# Patient Record
(demographics unavailable — no encounter records)

---

## 2025-01-16 NOTE — PHYSICAL EXAM
[Well Developed] : well developed [Well Nourished] : well nourished [No Acute Distress] : no acute distress [Normal Conjunctiva] : normal conjunctiva [Normal Venous Pressure] : normal venous pressure [No Carotid Bruit] : no carotid bruit [Normal S1, S2] : normal S1, S2 [No Murmur] : no murmur [No Rub] : no rub [No Gallop] : no gallop [Clear Lung Fields] : clear lung fields [Good Air Entry] : good air entry [No Respiratory Distress] : no respiratory distress  [Soft] : abdomen soft [Non Tender] : non-tender [No Masses/organomegaly] : no masses/organomegaly [Normal Bowel Sounds] : normal bowel sounds [Normal Gait] : normal gait [No Edema] : no edema [No Cyanosis] : no cyanosis [No Clubbing] : no clubbing [No Varicosities] : no varicosities [No Rash] : no rash [No Skin Lesions] : no skin lesions [Moves all extremities] : moves all extremities [No Focal Deficits] : no focal deficits [Normal Speech] : normal speech [Alert and Oriented] : alert and oriented [Appears Anxious] : appears anxious [de-identified] : Disheveled, no distress

## 2025-01-16 NOTE — DISCUSSION/SUMMARY
[Guardian] : the guardian [___ Month(s)] : in [unfilled] month(s) [EKG obtained to assist in diagnosis and management of assessed problem(s)] : EKG obtained to assist in diagnosis and management of assessed problem(s) [FreeTextEntry1] : 49-year-old male with history of right sided CVA in 9/11, followed for h/o of hypertension and hyperlipidemia,  s/p ILR not being followed by EPS, can d/c ILR, task sent again h/o CVA? in 2011 on DAPT unneeded, d/c Plavix, stay on ASA. Once again, advised complete discontinuation of smoking as the most beneficial thing he could do to prevent recurrent stroke. On ALBINO mask at night, when he remembers.   Strongly advised continued dietary modification. Saw Dr. Keith and considering GLP1 agonist for weight loss? Labs requested. Flu shot given. Rx renewed. Referred for chest xray to survey given h/o smoking.

## 2025-01-16 NOTE — REASON FOR VISIT
[Initial Evaluation] : an initial evaluation of [Chest Pain] : chest pain [FreeTextEntry1] : History of CVA

## 2025-01-16 NOTE — CARDIOLOGY SUMMARY
[de-identified] : 1/16/25, 1/15/24, Sinus Rhythm  -Old anterior infarct. 1/13/23, NSR 12/14/21, NSR [de-identified] : 4/21/21, EF 70%, mildly dilated Ao root.

## 2025-01-16 NOTE — HISTORY OF PRESENT ILLNESS
[FreeTextEntry1] : 49 year-old morbidly obese male here for routine physical examination.  He is being treated for HLD, h/o schizoaffective bipolar disorder, on meds.  H/O "mini stroke "in 2011, manifest with blurred vision and slurred speech. All of his symptoms resolved spontaneously. He has been on aspirin and plavix since then. Patient remains a smoker (but claims he is smoking less).   TTE unremarkable, s/p ILR placement in 3/19, followed by EPS at [facility] single event noted in 12/19, likely AV block due to vagal reaction. No recent interrogations from ILR (does not transmit). Referred for explantation.  Followed by Dr. Tomlinson for his COPD.  No new focal complaints at present. Still smoking 4-5 cigs per day, complains of chronic cough.

## 2025-05-19 NOTE — CARDIOLOGY SUMMARY
[de-identified] : 5/19/25, Sinus Rhythm -occasional ectopic ventricular beat   -Nonspecific QRS widening. 1/15/24, Sinus Rhythm  -Old anterior infarct. 1/13/23, NSR 12/14/21, NSR [de-identified] : 4/21/21, EF 70%, mildly dilated Ao root.

## 2025-05-19 NOTE — DISCUSSION/SUMMARY
[Guardian] : the guardian [___ Month(s)] : in [unfilled] month(s) [EKG obtained to assist in diagnosis and management of assessed problem(s)] : EKG obtained to assist in diagnosis and management of assessed problem(s) [FreeTextEntry1] : 50-year-old male with history of right sided CVA in 9/11, followed for h/o of hypertension and hyperlipidemia,  s/p ILR not being followed by EPS, can d/c ILR, task sent again to scheduling nurse. h/o CVA? in 2011 on DAPT unneeded, d/c Plavix, stay on ASA. Repeat TTE requested. Once again, advised complete discontinuation of smoking as the most beneficial thing he could do to prevent recurrent stroke. On ALBINO mask at night, when he remembers.   Strongly advised continued dietary modification.  Saw Dr. Keith and considering GLP1 agonist for weight loss? Labs reviewed.  Referred to pulmonary again for chronic cough.

## 2025-05-19 NOTE — PHYSICAL EXAM
[Well Developed] : well developed [Well Nourished] : well nourished [No Acute Distress] : no acute distress [Normal Conjunctiva] : normal conjunctiva [Normal Venous Pressure] : normal venous pressure [No Carotid Bruit] : no carotid bruit [Normal S1, S2] : normal S1, S2 [No Murmur] : no murmur [No Rub] : no rub [No Gallop] : no gallop [Clear Lung Fields] : clear lung fields [Good Air Entry] : good air entry [No Respiratory Distress] : no respiratory distress  [Soft] : abdomen soft [Non Tender] : non-tender [No Masses/organomegaly] : no masses/organomegaly [Normal Bowel Sounds] : normal bowel sounds [Normal Gait] : normal gait [No Edema] : no edema [No Cyanosis] : no cyanosis [No Clubbing] : no clubbing [No Varicosities] : no varicosities [No Rash] : no rash [No Skin Lesions] : no skin lesions [Moves all extremities] : moves all extremities [No Focal Deficits] : no focal deficits [Normal Speech] : normal speech [Alert and Oriented] : alert and oriented [Appears Anxious] : appears anxious [de-identified] : Disheveled, no distress

## 2025-05-19 NOTE — HISTORY OF PRESENT ILLNESS
[FreeTextEntry1] : 50 year-old morbidly obese male here for self requested "cancer screening" He is being treated for HLD, h/o schizoaffective bipolar disorder, on meds by psychiatry.  H/O "mini stroke "in 2011, manifest with blurred vision and slurred speech. All of his symptoms resolved spontaneously. He has been on aspirin and plavix since then. Patient remains a smoker (but claims he is smoking less).   TTE unremarkable, s/p ILR placement in 3/19, followed by EPS at [facility] single event noted in 12/19, likely AV block due to vagal reaction. No recent interrogations from ILR (does not transmit). Referred for explantation bu tDr. [facility] office cannot contact patient.  Followed by Dr. Tomlinson for his COPD. Says he had a CXR done at [facility] in January but report not in chart.  No new focal complaints at present. As per guardian here with patient, he sleeps excessively and has little volition. Still smoking 4-5 cigs per day, complains of chronic cough.